# Patient Record
Sex: MALE | Race: WHITE | Employment: FULL TIME | URBAN - METROPOLITAN AREA
[De-identification: names, ages, dates, MRNs, and addresses within clinical notes are randomized per-mention and may not be internally consistent; named-entity substitution may affect disease eponyms.]

---

## 2019-04-23 ENCOUNTER — HOSPITAL ENCOUNTER (OUTPATIENT)
Dept: VASCULAR SURGERY | Age: 54
Discharge: HOME | End: 2019-04-23
Attending: NURSE PRACTITIONER
Payer: COMMERCIAL

## 2019-04-23 DIAGNOSIS — I10 HYPERTENSION, ESSENTIAL: ICD-10-CM

## 2019-04-23 LAB
ABDOMINAL DIST AORTA VEL: 103.9 CM/S
ABDOMINAL MID AORTA VEL: 98.4 CM/S
ABDOMINAL PROX AORTA VEL: 103.9 CM/S
LEFT KIDNEY ARCUATE ARTERY MEDIAL EDV: 11.4 CM/S
LEFT KIDNEY ARCUATE ARTERY MEDIAL PSV: 23.1 CM/S
LEFT KIDNEY LENGTH: 11.77 CM
LEFT KIDNEY MED ARCUATE RI: 0.51
LEFT KIDNEY WIDTH: 6.71 CM
LEFT RENAL DIST RAR: 0.72
LEFT RENAL DIST SYS: 74.3 CM/S
LEFT RENAL MID RAR: 0.81
LEFT RENAL MID SYS: 84.6 CM/S
LEFT RENAL MIDDLE PARENCHYMA MAX: 25.5 CM/S
LEFT RENAL MIDDLE PARENCHYMA MIN: 11.4 CM/S
LEFT RENAL MIDDLE PARENCHYMA RI: 0.55
LEFT RENAL ORIGIN RAR: 1.04
LEFT RENAL ORIGIN SYS: 107.6 CM/S
LEFT RENAL PROX RAR: 0.92
LEFT RENAL PROX SYS: 95.4 CM/S
RIGHT KIDNEY ARCUATE ARTERY MEDIAL EDV: 10.4 CM/S
RIGHT KIDNEY ARCUATE ARTERY MEDIAL PSV: 21.4 CM/S
RIGHT KIDNEY LENGTH: 11.44 CM
RIGHT KIDNEY MED ARCUATE RI: 0.51
RIGHT KIDNEY WIDTH: 6.16 CM
RIGHT RENAL DIST RAR: 0.74
RIGHT RENAL DIST SYS: 77.3 CM/S
RIGHT RENAL MID RAR: 0.84
RIGHT RENAL MID SYS: 86.9 CM/S
RIGHT RENAL MIDDLE PARENCHYMA MAX: 23.9 CM/S
RIGHT RENAL MIDDLE PARENCHYMA MIN: 11.6 CM/S
RIGHT RENAL MIDDLE PARENCHYMA RI: 0.51
RIGHT RENAL ORIGIN RAR: 1.05
RIGHT RENAL ORIGIN SYS: 109.1 CM/S
RIGHT RENAL PROX RAR: 0.94
RIGHT RENAL PROX SYS: 97.4 CM/S

## 2019-04-23 PROCEDURE — 93975 VASCULAR STUDY: CPT

## 2020-01-08 ENCOUNTER — HOSPITAL ENCOUNTER (OUTPATIENT)
Dept: GENERAL RADIOLOGY | Age: 55
Discharge: HOME | End: 2020-01-08
Payer: COMMERCIAL

## 2020-01-08 DIAGNOSIS — M54.9 BACK PAIN: ICD-10-CM

## 2020-01-08 PROCEDURE — 72072 X-RAY EXAM THORAC SPINE 3VWS: CPT

## 2020-01-08 PROCEDURE — 72050 X-RAY EXAM NECK SPINE 4/5VWS: CPT

## 2020-01-08 PROCEDURE — 72114 X-RAY EXAM L-S SPINE BENDING: CPT

## 2020-01-29 ENCOUNTER — HOSPITAL ENCOUNTER (OUTPATIENT)
Dept: MRI IMAGING | Age: 55
Discharge: HOME | End: 2020-01-29
Attending: NURSE PRACTITIONER
Payer: COMMERCIAL

## 2020-01-29 DIAGNOSIS — M48.02 SPINAL STENOSIS IN CERVICAL REGION: ICD-10-CM

## 2020-01-29 DIAGNOSIS — M25.50 PAIN IN JOINT, MULTIPLE SITES: ICD-10-CM

## 2020-01-29 DIAGNOSIS — G54.2 CERVICAL SYNDROME: ICD-10-CM

## 2020-01-29 DIAGNOSIS — M79.602 LEFT ARM PAIN: ICD-10-CM

## 2020-01-29 PROCEDURE — 72141 MRI NECK SPINE W/O DYE: CPT

## 2020-02-19 ENCOUNTER — HOSPITAL ENCOUNTER (OUTPATIENT)
Dept: PHYSICAL THERAPY | Age: 55
Discharge: HOME | End: 2020-02-19
Payer: COMMERCIAL

## 2020-02-19 PROCEDURE — 97014 ELECTRIC STIMULATION THERAPY: CPT

## 2020-02-19 PROCEDURE — 97162 PT EVAL MOD COMPLEX 30 MIN: CPT

## 2020-02-19 PROCEDURE — 97110 THERAPEUTIC EXERCISES: CPT

## 2020-02-19 NOTE — PROGRESS NOTES
PHYSICAL THERAPY - DAILY TREATMENT NOTE    Patient Name: Paola Masters        Date: 2020  : 1965   YES Patient  Verified  Visit #:   1     Insurance: Payor: Jenny Jacobson / Plan: Yahir LEAL / Product Type: Commerical /      In time: 7:30 Out time: 8:30   Total Treatment Time: 60     Medicare/BCBS [place] Time Tracking (below)   Total Timed Codes (min):  NA 1:1 Treatment Time:  NA     TREATMENT AREA =  Neck pain, C/S radiculopathy    SUBJECTIVE    Pain Level (on 0 to 10 scale):  2  / 10 left upper back; left UE tingling   Medication Changes/New allergies or changes in medical history, any new surgeries or procedures? NO    If yes, update Summary List   Subjective Functional Status/Changes:  []  No changes reported     Pt reports onset of left UE tingling and left neck/upper back pain several months ago. Pt reports left UE weakness. Pt reports that symptoms are worsened with certain positions of head, and reports that pain is interfering with sleep. Pt reports that lying flat on back with head elevated is generally what relieves symptoms. Pt reports that he thinks that symptoms may have been caused by lifting weights improperly. Pt reports that symptoms have become progressively worse, especially over past 2 weeks. Pt reports that cardiac issues were ruled out. Pt reports that he has had C/S x-rays, NCS left UE and MRI C/S. Pt reports that he has bulging discs C2-C6 and impingement C7. Pt reports that he has been prescribed several medications to assist with symptoms. Pt reports prior episodes of neck pain, which he attributed to sleeping wrong and which resolved on its own. OBJECTIVE    Physical Therapy Evaluation Cervical Spine - [facility]    SUBJECTIVE  Chief Complaint: See above    Mechanism of injury: See above    Symptoms  Pain rating (0-10):    Today: 2   Best: 0   Worst: 7    [] Contstant:    [x] Intermittent:     Aggravated by:   [x] Bending [x] Sitting [] Standing [x] Reaching Overhead   [] Moving [] Cough [] Sneeze [] Eating   [] AM  [] PM  Lying:  [] sup   [] pro   [] sidelying   [] Other:     Eased by:    [] Bending [] Sitting [] Standing Lying: [x] sup  [] pro  [] sidelying   [] Moving [] AM  [] PM  [x] Other: left UE overhead in supine     General Health:  Red Flags Indicated? [] Yes    [x] No  [] Yes [] No Recent weight change (If yes, due to dieting? [] Yes  [] No)   [] Yes [] No Persistent cough  [] Yes [] No Unremitting pain at night  [] Yes [] No Dizziness  [] Yes [] No Blurred vision  [] Yes [] No Hands more cold or painful in cold weather  [] Yes [] No Ringing in ears  [] Yes [] No Difficulty swallowing  [] Yes [] No Dysfunction of bowel or bladder  [] Yes [] No Recent illness within past 3 weeks (i.e, cold, flu)  [] Yes [] No Jaw pain    Past History/Treatments: Medications/lidocaine injection    Diagnostic Tests: [] Lab work [x] X-rays    [] CT [x] MRI     [x] Other: NCS  Results: See above    Functional Status  Prior level of function: Working (desk job), exercising at gym  Present functional limitations: Difficulty sleeping due to pain  What position do you sleep in?: Supine with pillows under head    Headaches: Do you have headaches? [] Yes   [x] No  How often do you get headaches? How long does the headache last?  What aggravates it? What relieves it? Does the headache coincide with any other symptoms (visual disturbances, light sensitivity)? Where is the headache? Does it change locations?   Other:    OBJECTIVE  Posture: [] WNL  Head Position: Protracted  Shoulder/Scapular Position: Protracted  C-Kyphosis:  [] increased   [] decreased   C-Lordosis:   [] increased   [x] decreased  T-Kyphosis:  [x] increased   [] decreased  T-Lordosis:   [] increased   [] decreased     TMJ: [x] N/A [] Abnormal - ROM:   Palpation:    Cervical Retraction: [] WNL    [x] Abnormal: Decreased motion; repeated seated C/S retraction peripheralizes (tingling) symptoms to left UE; repeated supine C/S retractions peripheralizes symptoms (tingling) to left UE     Shoulder/Scapular Screen: [x] WNL    [] Abnormal:    Active Movements: [] N/A   [] Too acute   [] Other:  ROM % AROM % PROM Comments:pain, area   Forward flexion 60     Extension 35  Left C/S pain   SB right 50     SB left  50  Left C/S tightness   Rotation right 60     Rotation left 55  Left C/S pain     Thoracic Spine: [] N/A    [x] WNL   [] Other:    PROM: NT    Palpation: - No TTP  [] Min  [] Mod  [] Severe    Location:  [] Min  [] Mod  [] Severe    Location:  [] Min  [] Mod  [] Severe    Location:    Neuro Screen (myotome/dematome/felexes): [] WNL  Myotome Level Muscle Test Myotome Level Muscle Test   C5 Shoulder Adduction - Deltoid C8 Finger Flexors   C6 Wrist Extension T1 Finger Abduction - Interossei   C7 Elbow Extension     Comments: Left elbow ext = 4/5, left wrist flex = 4/5, otherwise 5/5 B UE    Upper Limb Tension Tests: [] N/A       Ulnar: [] R    [] L    [] +    [] -       Median: [] R    [] L    [] +    [] -       Radial: [] R    [] L    [] +    [] -    Special Tests:  Cervical:        Vertebral Artery:  [] R    [] L    [] +    [] -       Alar Ligament: [] R    [] L    [] +    [] -       Transverse Lig: [] R    [] L    [] +    [] -       Spurling's:  [] R    [x] L    [x] +    [] -       Distraction:  [] R    [] L    [] +    [x] -       Compression: [] R    [x] L    [x] +    [] -    Thoracic Outlet Tests: [] N/A       Adson's:  [] R    [] L    [] +    [] -       Hyperabduction: [] R    [] L    [] +    [] -       Austin's:  [] R    [] L    [] +    [] -       Anu:  [] R    [] L    [] +    [] -    Diaphragmatic Breathing: [] Normal    [] Abnormal    Muscle Flexibility: [] N/A   Scalenes: [] WNL    [] Tight    [] R    [] L   Upper Trap: [] WNL    [] Tight    [] R    [] L   Levator: [] WNL    [] Tight    [] R    [] L   Pect.  Minor: [] WNL    [] Tight    [] R    [] L    Global Muscular Weakness: [] N/A   Lower Trap:   Rhomboids:   Middle Trap:   Serratus Ant:   Ext Rotators:    Other:    Other tests/comments: Left UE tingling abolishes in supine, returns with sitting; C/S traction increases left UE tingling; repeated C/S retraction with OP and retraction with extension in supine increase left UE tingling; repeated left C/S rotation in supine increases left UE tingling; repeated right C/S rotation in supine NE  + shoulder abduction test on left    Modalities Rationale: decrease pain to improve the patient's ability to perform ADLs/IADLs without increased pain/symptoms      15 min [x] Estim, type/location: IFC to C/S supine with heat                                     []  att     [x]  unatt     []  w/US     []  w/ice    [x]  w/heat    min []  Mechanical Traction: type/lbs                   []  pro   []  sup   []  int   []  cont    []  before manual    []  after manual    min []  Ultrasound, settings/location:      min []  Iontophoresis w/ dexamethasone, location:                                               []  take home patch       []  in clinic    min []  Ice     []  Heat    location/position:     min []  Vasopneumatic Device, press/temp:     min []  Other:    [x] Skin assessment post-treatment (if applicable):    [x]  intact    []  redness- no adverse reaction     []redness  adverse reaction:      8 min Therapeutic Exercise:  [x]  See flow sheet   Rationale:      increase ROM and decrease symptoms to improve the patients ability to perform ADLs/IADLs without increased pain/symptoms     During TE min Patient Education:  YES  Reviewed HEP   [x]  Progressed/Changed HEP based on:   Initiated supine C/S rotation; discussed centralization/peripheralization of symptoms and avoidance of activities that produce/increase/peripheralize symptoms; discussed positioning for sleep and work station ergonomics     Other Objective/Functional Measures:    See eval    Initiated repeated supine right C/S rotation     Post Treatment Pain Level (on 0 to 10) scale:   2  / 10     ASSESSMENT    Assessment/Changes in Function:     See plan of care    Justification for Eval Code Complexity:  Patient History : MEDIUM - HTN, latex allergy  Examination HIGH - See objective  Clinical Presentation: MEDIUM  Clinical Decision Making : MEDIUM - FOTO 52/100     []  See Progress Note/Recertification   Patient will continue to benefit from skilled PT services: see plan of care   Progress toward goals / Updated goals:    See plan of care       PLAN    [x]  Upgrade activities as tolerated YES Continue plan of care   []  Discharge due to :    []  Other:      Therapist: Rigoberto Price, PT    Date: 2/19/2020 Time: 7:29 AM

## 2020-02-19 NOTE — PROGRESS NOTES
[address]  [facility] PHYSICAL THERAPY  [address] [facility] [place], [address] - Phone: [phone]  Fax: [phone] / [address]  Patient Name: Jonny Campos : 1965   Medical   Diagnosis: Neck pain [M54.2]   Cervical radiculopathy [M54.12] Treatment Diagnosis: Neck pain [M54.2]   Cervical radiculopathy [M54.12]   Onset Date: Several months ago     Referral Source: Sumanth Martinez DO Start of [facility]): 2020   Prior Hospitalization: See medical history Provider #: [id number]   Prior Level of Function: Independent with ADLs, ambulation; working desk job; exercising at gym   Comorbidities: HTN, latex allergy   Medications: Verified on Patient Summary List   The Plan of Care and following information is based on the information from the initial evaluation.   ===========================================================================================  Assessment / key information:  Patient is a 53 y.o. male who presents with complaints of onset of left-sided neck pain and left UE tingling several months ago. Patient reports that imaging revealed bulging discs C2-C6 and nerve impingement C7. Patient demonstrates impaired posture, decreased/painful C/S ROM (see below), increased symptoms with C/S retraction and retraction with extension, decreased left elbow extensor/wrist flexor strength (consistent with C7 nerve impingement), + Spurling's test on left (consistent with cervical radiculopathy), decreased position/activity tolerance and impaired ADL/IADL function. Patient would benefit from skilled PT services to address these issues and improve function.   Thank you for this referral.    Active Movements:   ROM % AROM % PROM Comments:pain, area   Forward flexion 60       Extension 35   Left C/S pain   SB right 50       SB left  50   Left C/S tightness   Rotation right 60       Rotation left 55   Left C/S pain    FOTO: 52/100 (moderate)  ==========================================================================================  Eval Complexity: History: MEDIUM  Complexity : 1-2 comorbidities / personal factors will impact the outcome/ POC Exam:HIGH Complexity : 4+ Standardized tests and measures addressing body structure, function, activity limitation and / or participation in recreation  Presentation: MEDIUM Complexity : Evolving with changing characteristics  Clinical Decision Making:MEDIUM Complexity : FOTO score of 26-74Overall Complexity:MEDIUM    Problem List: pain affecting function, decrease ROM, decrease strength, decrease ADL/ functional abilitiies, decrease activity tolerance and decrease flexibility/ joint mobility   Treatment Plan may include any combination of the following: Therapeutic exercise, Therapeutic activities, Neuromuscular re-education, Physical agent/modality, Manual therapy, Patient education and Functional mobility training  Patient / Family readiness to learn indicated by: asking questions, trying to perform skills and interest  Persons(s) to be included in education: patient (P)  Barriers to Learning/Limitations: None  Measures taken:    Patient Goal (s): \"Correct bulging discs in neck which are impinging on nerve. \"   Patient self reported health status: good  Rehabilitation Potential: good   Short Term Goals: To be accomplished in  2  weeks:  1. Patient will demonstrate compliance with HEP. 2. Patient will demonstrate ability to centralize left UE symptoms to allow increased activity tolerance. 3. Patient will demonstrate symmetrical C/S rotation AROM to facilitate ADLs/IADLs.  Long Term Goals: To be accomplished in  4  weeks:  1. Patient will demonstrate independence with HEP. 2. Patient will report greater than or equal to 50% improvement in sleep disturbance. 3. Patient will report greater than or equal to 50% improvement in tolerance of work activities, including computer use. 4. Patient will score greater than or equal to 65/100 on FOTO to indicate improved function. Frequency / Duration:   Patient to be seen  2-3  times per week for 4  weeks:  Patient / Caregiver education and instruction: activity modification and exercises    Therapist Signature: Shefali Talbert PT Date: 7/53/5206   Certification Period: NA Time: 8:36 AM   ===========================================================================================  I certify that the above Physical Therapy Services are being furnished while the patient is under my care. I agree with the treatment plan and certify that this therapy is necessary. Physician Signature:        Date:       Time:     Please sign and return to [facility] or you may fax the signed copy to [phone]. Thank you.

## 2020-02-20 ENCOUNTER — HOSPITAL ENCOUNTER (OUTPATIENT)
Dept: PHYSICAL THERAPY | Age: 55
Discharge: HOME | End: 2020-02-20
Payer: COMMERCIAL

## 2020-02-20 PROCEDURE — 97530 THERAPEUTIC ACTIVITIES: CPT

## 2020-02-20 PROCEDURE — 97140 MANUAL THERAPY 1/> REGIONS: CPT

## 2020-02-20 PROCEDURE — 97110 THERAPEUTIC EXERCISES: CPT

## 2020-02-21 ENCOUNTER — APPOINTMENT (OUTPATIENT)
Dept: PHYSICAL THERAPY | Age: 55
End: 2020-02-21
Payer: COMMERCIAL

## 2020-02-24 ENCOUNTER — HOSPITAL ENCOUNTER (OUTPATIENT)
Dept: PHYSICAL THERAPY | Age: 55
Discharge: HOME | End: 2020-02-24
Payer: COMMERCIAL

## 2020-02-24 PROCEDURE — 97110 THERAPEUTIC EXERCISES: CPT

## 2020-02-24 PROCEDURE — 97012 MECHANICAL TRACTION THERAPY: CPT

## 2020-02-24 PROCEDURE — 97140 MANUAL THERAPY 1/> REGIONS: CPT

## 2020-02-24 NOTE — PROGRESS NOTES
PHYSICAL THERAPY - DAILY TREATMENT NOTE    Patient Name: Graciela Cam        Date: 2020  : 1965   YES Patient  Verified  Visit #:   3     Insurance: Payor: Vale Trevino / Plan: Jodie Cristina RPN / Product Type: Commerical /      In time: 7:59 Out time: 9:47   Total Treatment Time: 48       TREATMENT AREA = Neck pain [M54.2]    SUBJECTIVE    Pain Level (on 0 to 10 scale):  3  / 10   Medication Changes/New allergies or changes in medical history, any new surgeries or procedures? NO    If yes, update Summary List   Subjective Functional Status/Changes:  []  No changes reported     \"It's more tolerable. I was able to the homework lying down. The sensation is still there but more awake. I did get better sleep than I have been. I'm feeling more consistent pain to the (UT region). After Friday the whole day I didn't feel a whole lot in my left arm. I did have low grade discomfort throughout the right arm on the arm. very few instances of shooting pain. Right now my hand is numb. \"     \"I'm supposed to go to [place] on Saturday for a week, back for a week, and then  for a week.       OBJECTIVE     Therapeutic Procedures:  Min Procedure Specifics + Rationale   n/a [x]  Patient Education (performed throughout session) [x] Review HEP    [] Progressed/Changed HEP based on:   [] proper performance and advancement of Therex/TA   [] reduction in pain level    [] increased functional capacity       [] change in directional preference   10(10) [x] Therapeutic Exercise   [x]  See Flowsheet   Rationale: increase ROM and increase strength to improve the patients ability to participate in ADL's    25 [x]  Manual Therapy       [] IASTM -   [] TDN (see objective; actual needle insertion time not billed)   [x] supine manual traction  Supine repeated left rotation mobilization  Supine repeated left lateral side bend mobilization  Rationale: decrease pain, increase ROM, increase tissue extensibility, decrease trigger points and increase postural awareness to attain functional use and participation with ADL's  [x] Skin assessment post-treatment:  [x]intact []redness- no adverse reaction   []redness  adverse     Modality rationale: decrease inflammation, decrease pain, increase tissue extensibility and increase muscle contraction/control to improve the patients ability to perform ADL's with greater ease       Min Type Additional Details   15 [x]  Traction:    [x] Cervical  []Lumbar  [x] Intermitent   30/20seconds   []Continuous Steps up&down: 1up/1down  10lbs max/5lbs min  Angle: 30 Degrees   [x] supine              [] prone  [x] legs elevated    [] legs flat   [x] with MHP to C/S   [] with strap         [] without strap   [x] Skin assessment post-treatment:  [x]intact [x]redness- no adverse reaction       []redness  adverse reaction:     Other Objective/Functional Measures:    Improved tolerance towards isometrics. Discussed with patient travel plans  Educated patient re: mechanical traction.     Post Treatment Pain Level (on 0 to 10) scale:   2 / 10     ASSESSMENT    Assessment/Changes in Function:       Reduced traction pull time from 60:20 to 30:20  Centralization continues with confirmation of left lateral component being relevant as patient's numbness changed to tingling in fingers and had reported centralization to shoulder          Patient will continue to benefit from skilled PT services to modify and progress therapeutic interventions, address functional mobility deficits, address ROM deficits, address strength deficits, analyze and address soft tissue restrictions, analyze and cue movement patterns, analyze and modify body mechanics/ergonomics and instruct in home and community integration  to attain remaining goals   Progress toward goals / Updated goals:    Improved cervical posture     PLAN    [x]  Upgrade activities as tolerated  [x]  Update interventions per flow sheet YES Continue plan of care   []  Discharge due to :    []  Other:      Therapist: Jalen Encinas \"BJ\" Sandy, DPT, Cert. MDT, Cert. DN, Cert. SMT, Dip.  Osteopractic    Date: 2/24/2020 Time: 8:05 AM     Future Appointments   Date Time Provider Castillo Leona   2/26/2020  8:00 AM Naida Libman, [facility]   2/27/2020  5:00 PM Naida Libman, [facility]   3/6/2020  8:00 AM Naida Libman, [facility]   3/9/2020  8:00 AM Naida Libman, [facility]   3/11/2020  4:30 PM Champ Osei [facility]   3/13/2020  8:00 AM Naida Libman, [facility]   3/23/2020  8:00 AM Naida Libman, [facility]   3/25/2020  4:30 PM Tessie Royal [facility]   3/27/2020  8:00 AM Champ Osei [facility]   3/30/2020  8:00 AM Naida Libman, [facility]

## 2020-02-26 ENCOUNTER — HOSPITAL ENCOUNTER (OUTPATIENT)
Dept: PHYSICAL THERAPY | Age: 55
Discharge: HOME | End: 2020-02-26
Payer: COMMERCIAL

## 2020-02-26 PROCEDURE — 97530 THERAPEUTIC ACTIVITIES: CPT

## 2020-02-26 PROCEDURE — 97110 THERAPEUTIC EXERCISES: CPT

## 2020-02-26 PROCEDURE — 97014 ELECTRIC STIMULATION THERAPY: CPT

## 2020-02-26 PROCEDURE — 97012 MECHANICAL TRACTION THERAPY: CPT

## 2020-02-26 PROCEDURE — 97140 MANUAL THERAPY 1/> REGIONS: CPT

## 2020-02-26 NOTE — PROGRESS NOTES
PHYSICAL THERAPY - DAILY TREATMENT NOTE    Patient Name: Alessandra Fitzgerald        Date: 2020  : 1965   YES Patient  Verified  Visit #:   4     Insurance: Payor: Milli Galeana / Plan: Isreal Castro RPN / Product Type: Commerical /      In time: 8:00 Out time: 8:55   Total Treatment Time: 55     TREATMENT AREA = Neck pain [M54.2]    SUBJECTIVE    Pain Level (on 0 to 10 scale):  3  / 10   Medication Changes/New allergies or changes in medical history, any new surgeries or procedures? NO    If yes, update Summary List   Subjective Functional Status/Changes:  []  No changes reported     \"I cancelled my trip to [place]. I have an overnighter to [place] today though. \"          OBJECTIVE    Therapeutic Procedures:  Min Procedure Specifics + Rationale   n/a [x]  Patient Education (performed throughout session) [x] Review HEP    [] Progressed/Changed HEP based on:   [] proper performance and advancement of Therex/TA   [] reduction in pain level    [] increased functional capacity       [] change in directional preference   15 [x] Therapeutic Exercise   [x]  See Flowsheet   Rationale: increase ROM and increase strength to improve the patients ability to participate in ADL's    15 []  Manual Therapy       [] IASTM    [] TDN (see objective; actual needle insertion time not billed)   [x] repeated retraction mobilization in supine  Rationale: decrease pain, increase ROM, increase tissue extensibility, decrease trigger points and increase postural awareness to attain functional use and participation with ADL's  [x] Skin assessment post-treatment:  [x]intact []redness- no adverse reaction   []redness  adverse   10 [x] Therapeutic Activity   [x]  See Flowsheet  Performed repeated retractions with SB Left /rotationLeft throughout treatment b/w sets/reps/exercises as needed as prophylaxis and symptoms management. Rationale:  To improve safety, proprioception, coordination, and efficiency with tasks     Modality rationale: decrease inflammation, decrease pain, increase tissue extensibility and increase muscle contraction/control to improve the patients ability to perform ADL's with greater ease       Min Type Additional Details   15 [x] E-Stim Type:Symmetrical Bihasic  Attended? NO Location: left C/S and UE  [] supine              [] prone  [] legs elevated   [] legs flat  []w/heat  []w/ice  [] sitting    15 [x]  Traction:    [x] Cervical  []Lumbar  [x] Intermitent      []Continuous Ramp/ hold/ lbs:  15/10  Angle: 30 Degrees   [x] supine              [] prone  [x] legs elevated    [] legs flat   [x] with MHP to C/S   [] with strap         [x] without strap   [x] Skin assessment post-treatment:  [x]intact [x]redness- no adverse reaction       []redness  adverse reaction:     Other Objective/Functional Measures: Added new therex per flow sheet     Post Treatment Pain Level (on 0 to 10) scale:   2  / 10     ASSESSMENT    Assessment/Changes in Function:     Improved cervical posture  Left UE paraesthesia during wall wash but transient. Patient will continue to benefit from skilled PT services to modify and progress therapeutic interventions, address functional mobility deficits, address ROM deficits, address strength deficits, analyze and address soft tissue restrictions, analyze and cue movement patterns, analyze and modify body mechanics/ergonomics and instruct in home and community integration  to attain remaining goals   Progress toward goals / Updated goals:    Derangement reducing. PLAN    [x]  Upgrade activities as tolerated  [x]  Update interventions per flow sheet YES Continue plan of care   []  Discharge due to :    []  Other:      Therapist: Liliana Finch \"BJ\" PRAKASH Delgado, Cert. MDT, Cert. DN, Cert. SMT, Dip.  Osteopractic    Date: 2/26/2020 Time: 8:00 AM     Future Appointments   Date Time Provider [facility] [place]   2/27/2020  5:00 PM Alondra Jerome [address] [place] [address]   3/6/2020  8:00 AM Alondra Jerome [facility]   3/9/2020  8:00 AM Maurice Ny [facility]   3/11/2020  4:30 PM Nuriaine Boeck [facility]   3/13/2020  8:00 AM Maurice Ny, [facility]   3/23/2020  8:00 AM Maurice Ny [facility]   3/25/2020  4:30 PM Nuriaine Boeck [facility]   3/27/2020  8:00 AM Nuriaine Boeck [facility]   3/30/2020  8:00 AM Maurice Ny [facility]

## 2020-02-27 ENCOUNTER — HOSPITAL ENCOUNTER (OUTPATIENT)
Dept: PHYSICAL THERAPY | Age: 55
Discharge: HOME | End: 2020-02-27
Payer: COMMERCIAL

## 2020-02-27 PROCEDURE — 97140 MANUAL THERAPY 1/> REGIONS: CPT

## 2020-02-27 PROCEDURE — 97014 ELECTRIC STIMULATION THERAPY: CPT

## 2020-02-27 PROCEDURE — 97110 THERAPEUTIC EXERCISES: CPT

## 2020-02-27 NOTE — PROGRESS NOTES
PHYSICAL THERAPY - DAILY TREATMENT NOTE    Patient Name: Jonny Campos        Date: 2020  : 1965   YES Patient  Verified  Visit #:   5     Insurance: Payor: Angela Palafox / Plan: Jacinta Rodriguez RPN / Product Type: Commerical /      In time: 5:00 Out time: 5:50   Total Treatment Time: 50     TREATMENT AREA = Neck pain [M54.2]    SUBJECTIVE    Pain Level (on 0 to 10 scale):  3  / 10   Medication Changes/New allergies or changes in medical history, any new surgeries or procedures? NO    If yes, update Summary List   Subjective Functional Status/Changes:  []  No changes reported     \"I've actually been pretty good today until I got home from [place]. I rode with someone on the way up and back . It was uncomfortable.  \"          OBJECTIVE    Therapeutic Procedures:  Min Procedure Specifics + Rationale   n/a [x]  Patient Education (performed throughout session) [x] Review HEP    [] Progressed/Changed HEP based on:   [] proper performance and advancement of Therex/TA   [] reduction in pain level    [] increased functional capacity       [] change in directional preference   10 [x] Therapeutic Exercise   [x]  See Flowsheet   Rationale: increase ROM and increase strength to improve the patients ability to participate in ADL's    25 []  Manual Therapy       [] IASTM    [] TDN (see objective; actual needle insertion time not billed)   [x] seated repeated retraction with traction  Seated repeated left rotation mobilization  Seated left SB mobilization  Supine traction mobilization  Supine repeated left side bend mobilization  Rationale: decrease pain, increase ROM, increase tissue extensibility, decrease trigger points and increase postural awareness to attain functional use and participation with ADL's  [x] Skin assessment post-treatment:  [x]intact []redness- no adverse reaction   []redness  adverse     Modality rationale: decrease inflammation, decrease pain, increase tissue extensibility and increase muscle contraction/control to improve the patients ability to perform ADL's with greater ease       Min Type Additional Details   15 [x] E-Stim Type:Symmetrical Biphasic  Attended? NO Location: left UT/arm  [] supine              [] prone  [] legs elevated   [] legs flat  []w/heat  []w/ice  [] sitting    [x] Skin assessment post-treatment:  [x]intact [x]redness- no adverse reaction       []redness  adverse reaction:     Other Objective/Functional Measures: Added seated retraction with T/S extension, progressing to include C/S extension with T/S extension  Initial presentation of neck posture noted to have return of right wry neck. Post Treatment Pain Level (on 0 to 10) scale:   1-2  / 10     ASSESSMENT    Assessment/Changes in Function:     Correction of right wry neck gradually restored with         Patient will continue to benefit from skilled PT services to modify and progress therapeutic interventions, address functional mobility deficits, address ROM deficits, address strength deficits, analyze and address soft tissue restrictions, analyze and cue movement patterns, analyze and modify body mechanics/ergonomics, assess and modify postural abnormalities, address imbalance/dizziness and instruct in home and community integration  to attain remaining goals   Progress toward goals / Updated goals:    Improved pain level despite recent exacerbation of wry neck. PLAN    [x]  Upgrade activities as tolerated  [x]  Update interventions per flow sheet YES Continue plan of care   []  Discharge due to :    []  Other:      Therapist: Billy Hylton \"BJ\" PRAKASH Delgado, Cert. MDT, Cert. DN, Cert. SMT, Dip.  Osteopractic    Date: 2/27/2020 Time: 5:01 PM     Future Appointments   Date Time Provider Castillo Delgadillo   3/2/2020  8:00 AM Rudy Duron [facility]   3/4/2020  8:00 AM Chaya [facility]   3/6/2020  8:00 AM Chaya [facility]   3/9/2020  8:00 AM Chaya [facility]   3/11/2020  4[facility]30 [facility]   3/13/2020  8:00 AM Charmayne Landsman, PT [facility]   3/23/2020  8:00 AM Charmayne Landsman, PT [facility]   3/25/2020  4:30 PM [facility]   3/27/2020  8:00 AM [facility]   3/30/2020  8:00 AM Charmayne Landsman, PT [facility]

## 2020-03-02 ENCOUNTER — HOSPITAL ENCOUNTER (OUTPATIENT)
Dept: PHYSICAL THERAPY | Age: 55
Discharge: HOME | End: 2020-03-02
Payer: COMMERCIAL

## 2020-03-02 PROCEDURE — 97530 THERAPEUTIC ACTIVITIES: CPT

## 2020-03-02 PROCEDURE — 97012 MECHANICAL TRACTION THERAPY: CPT

## 2020-03-02 PROCEDURE — 97110 THERAPEUTIC EXERCISES: CPT

## 2020-03-02 PROCEDURE — 97140 MANUAL THERAPY 1/> REGIONS: CPT

## 2020-03-02 NOTE — PROGRESS NOTES
PHYSICAL THERAPY - DAILY TREATMENT NOTE    Patient Name: Diana De La Garza        Date: 3/2/2020  : 1965   yes Patient  Verified  Visit #:   6     Insurance: Payor: Dede Tompkins / Plan: Dede Lynne RPN / Product Type: Commerical /      In time: 843 Out time: 840   Total Treatment Time: 66     TREATMENT AREA =  Neck pain [M54.2]    SUBJECTIVE  Pain Level (on 0 to 10 scale):  2  / 10   Medication Changes/New allergies or changes in medical history, any new surgeries or procedures?    no  If yes, update Summary List   Subjective Functional Status/Changes:  []  No changes reported     Pt reports no increase sxs over the weekend, \"I took it easy\"  Pt reports increased centralization to left SH then prior to [facility], however, intermittent radic sxs to the hand       OBJECTIVE  Modalities Rationale:     decrease pain and increase tissue extensibility to improve patient's ability to  perform c/s AROM for ADLs/driving, lifting, reaching, and to perform prolong sitting without pain     min [] Estim, type/location:                                      []  att     []  unatt     []  w/US     []  w/ice    []  w/heat   10 min [x]  Mechanical Traction: type/lbs 30 deg angle   18#>5#  30\":20\" with MHP                  []  pro   [x]  sup   [x]  int   []  cont    []  before manual    [x]  after manual    min []  Ultrasound, settings/location:      min []  Iontophoresis w/ dexamethasone, location:                                               []  take home patch       []  in clinic    min []  Ice     []  Heat    location/position:     min []  Vasopneumatic Device, press/temp:     min []  Other:    [x] Skin assessment post-treatment (if applicable):    [x]  intact    []  redness- no adverse reaction     []redness  adverse reaction:        38 min Therapeutic Exercise:  [x]  See flow sheet   Rationale:      increase ROM, increase strength and improve coordination to improve the patients ability to  perform c/s AROM for ADLs/driving, lifting, reaching, and to perform prolong sitting without pain       10 min Manual Therapy: SOR, STM to c/s guillermo, rep c/s ret> Rot left  rep c/s SB left- periph; manual c/s TX centralized to Left SH   Rationale:      decrease pain, increase ROM, increase tissue extensibility, decrease trigger points and increase postural awareness to improve patient's ability to  perform c/s AROM for ADLs/driving, lifting, reaching, and to perform prolong sitting without pain      8 min Therapeutic Activity: [x]  See flow sheet  postural/bed mobility training   Rationale:    increase ROM and increase strength to improve the patients ability to  perform c/s AROM for ADLs/driving, lifting, reaching, and to perform prolong sitting without pain        Billed With/As:   [] TE   [x] TA   [] Neuro   [] Self Care Patient Education: [x] Review HEP    [] Progressed/Changed HEP based on:   [x] positioning   [x] body mechanics   [x] transfers   [] heat/ice application    [x] other: Pt ed on importance and benefits of compliance with HEP, core strength/stability and proper posture; pt verbalized understanding       Other Objective/Functional Measures:  VCs + demo to perform proper technique for TE  rep c/s ret- P/W, rep ret + left rot=B/B  rep c/s SB left -P/NE  rep t/s ext - P/W periph to bicep/deltiod; able to centralize to Left SH with c/s ret + left rot   attempted 1/2 FR under spine in supine performing scap stabs   Post Treatment Pain Level (on 0 to 10) scale:  1-2 / 10     ASSESSMENT  Assessment/Changes in Function:   c/s rot WNL B sides  limited by periph  instructed to perform rep c/s ret + rot every hr   []  See Progress Note/Recertification   Patient will continue to benefit from skilled PT services to modify and progress therapeutic interventions, address functional mobility deficits, address ROM deficits, address strength deficits, analyze and address soft tissue restrictions, analyze and cue movement patterns, analyze and modify body mechanics/ergonomics, assess and modify postural abnormalities and instruct in home and community integration to attain remaining goals. Progress toward goals / Updated goals: · Short Term Goals: To be accomplished in  2  weeks:  1. Patient will demonstrate compliance with HEP. 2. Patient will demonstrate ability to centralize left UE symptoms to allow increased activity tolerance. progressing, intermittently centralized to left SH  3. Patient will demonstrate symmetrical C/S rotation AROM to facilitate ADLs/IADLs. progressing, c/s rot   · Long Term Goals: To be accomplished in  4  weeks:  1. Patient will demonstrate independence with HEP. 2. Patient will report greater than or equal to 50% improvement in sleep disturbance. 3. Patient will report greater than or equal to 50% improvement in tolerance of work activities, including computer use.    4. Patient will score greater than or equal to 65/100 on FOTO to indicate improved function     PLAN  []  Upgrade activities as tolerated yes Continue plan of care   []  Discharge due to :    []  Other:      Therapist: Sharyle Mosher, PTA    Date: 3/2/2020 Time: 8:10 AM     Future Appointments   Date Time Provider Castillo Delgadillo   3/4/2020  8:00 AM Yanni Burden [facility]   3/6/2020  8:00 AM Yanni Burden [facility]   3/9/2020  8:00 AM Yanni Burden [facility]   3/11/2020  4:30 PM Aditi Rafa, [facility]   3/13/2020  8:00 AM Yanni Burden [facility]   3/23/2020  8:00 AM Yanni Burden [facility]   3/25/2020  4:30 PM Cierra Oneal [facility]   3/27/2020  8:00 AM Aditi Score, [facility]   3/30/2020  8:00 AM Yanni Burden, [facility]

## 2020-03-04 ENCOUNTER — HOSPITAL ENCOUNTER (OUTPATIENT)
Dept: PHYSICAL THERAPY | Age: 55
Discharge: HOME | End: 2020-03-04
Payer: COMMERCIAL

## 2020-03-04 PROCEDURE — 97110 THERAPEUTIC EXERCISES: CPT

## 2020-03-04 PROCEDURE — 97012 MECHANICAL TRACTION THERAPY: CPT

## 2020-03-04 NOTE — PROGRESS NOTES
PHYSICAL THERAPY - DAILY TREATMENT NOTE    Patient Name: Gretchen Le        Date: 3/4/2020  : 1965   YES Patient  Verified  Visit #:     Insurance: Payor: Dayday Ludwig / Plan: Roy Ormond RPN / Product Type: Commerical /      In time: 7:58 Out time: 8:51   Total Treatment Time: 53     TREATMENT AREA = Neck pain [M54.2]    SUBJECTIVE    Pain Level (on 0 to 10 scale):  2  / 10   Medication Changes/New allergies or changes in medical history, any new surgeries or procedures? NO    If yes, update Summary List   Subjective Functional Status/Changes:  []  No changes reported     \"Some in the finger tips. Most of the discomfort is in the left upper shoulder area. \"          OBJECTIVE    Therapeutic Procedures:  Min Procedure Specifics + Rationale   n/a [x]  Patient Education (performed throughout session) [x] Review HEP    [] Progressed/Changed HEP based on:   [] proper performance and advancement of Therex/TA   [] reduction in pain level    [] increased functional capacity       [] change in directional preference   38 [x] Therapeutic Exercise   [x]  See Flowsheet   Rationale: increase ROM and increase strength to improve the patients ability to participate in ADL's      Modality rationale: decrease inflammation, decrease pain, increase tissue extensibility and increase muscle contraction/control to improve the patients ability to perform ADL's with greater ease       Min Type Additional Details   15 [x]  Traction:    [x] Cervical  []Lumbar  [x] Intermitent      []Continuous Ramp/ hold/ lbs:  14/5  Angle: 30 Degrees   [x] supine              [] prone  [x] legs elevated    [] legs flat   [x] with MHP to C/S   [] with strap         [x] without strap   [x] Skin assessment post-treatment:  [x]intact [x]redness- no adverse reaction       []redness  adverse reaction:     Other Objective/Functional Measures:    Initiated left rotation but peripheralized worse.  Patient reports better response towards SB in loaded position vs left rotation   Added more stability therex per flow sheet. Transitioned supine scap stabs to standing at wall with C/S Iso. Post Treatment Pain Level (on 0 to 10) scale:   2shoulder  / 10     ASSESSMENT    Assessment/Changes in Function:     Lateral component reducing as patient able to perform all therex for stability wthout exacerbation of symptoms         Patient will continue to benefit from skilled PT services to modify and progress therapeutic interventions, address functional mobility deficits, address ROM deficits, address strength deficits, analyze and address soft tissue restrictions, analyze and cue movement patterns, analyze and modify body mechanics/ergonomics, assess and modify postural abnormalities, address imbalance/dizziness and instruct in home and community integration  to attain remaining goals   Progress toward goals / Updated goals:    Postural deformity in wry neck appears to have resolved. PLAN    [x]  Upgrade activities as tolerated  [x]  Update interventions per flow sheet YES Continue plan of care   []  Discharge due to :    []  Other:      Therapist: Jalen Encinas \"BJ\" Sandy, PRAKASH, Cert. MDT, Cert. DN, Cert. SMT, Dip.  Osteopractic    Date: 3/4/2020 Time: 8:01 AM     Future Appointments   Date Time Provider Castillo Delgadillo   3/6/2020  8:00 AM Naida Libman, [facility]   3/9/2020  8:00 AM Naida Libman, PT [facility]   3/11/2020  4:30 PM Champ Osei [facility]   3/13/2020  8:00 AM Naida Libman, [facility]   3/23/2020  8:00 AM Naida Libman, PT [facility]   3/25/2020  4:30 PM Tessie [facility]   3/27/2020  8:00 AM Champ Osei [facility]   3/30/2020  8:00 AM Naida Libman, [facility]

## 2020-03-06 ENCOUNTER — HOSPITAL ENCOUNTER (OUTPATIENT)
Dept: PHYSICAL THERAPY | Age: 55
Discharge: HOME | End: 2020-03-06
Payer: COMMERCIAL

## 2020-03-06 PROCEDURE — 97012 MECHANICAL TRACTION THERAPY: CPT

## 2020-03-06 PROCEDURE — 97110 THERAPEUTIC EXERCISES: CPT

## 2020-03-06 NOTE — PROGRESS NOTES
PHYSICAL THERAPY - DAILY TREATMENT NOTE    Patient Name: Carl Combs        Date: 3/6/2020  : 1965   YES Patient  Verified  Visit #:     Insurance: Payor: Carie Berry / Plan: Flakita LEAL / Product Type: Commerical /      In time: 7:59 Out time: 8:53   Total Treatment Time: 53     TREATMENT AREA = Neck pain [M54.2]    SUBJECTIVE    Pain Level (on 0 to 10 scale):  1  / 10   Medication Changes/New allergies or changes in medical history, any new surgeries or procedures? NO    If yes, update Summary List   Subjective Functional Status/Changes:  []  No changes reported     \"Going to go up to [place] this weekend, but I'll be a passenger. \"          OBJECTIVE    Therapeutic Procedures:  Min Procedure Specifics + Rationale   n/a [x]  Patient Education (performed throughout session) [x] Review HEP    [] Progressed/Changed HEP based on:   [] proper performance and advancement of Therex/TA   [] reduction in pain level    [] increased functional capacity       [] change in directional preference   38 [x] Therapeutic Exercise   [x]  See Flowsheet   Rationale: increase ROM and increase strength to improve the patients ability to participate in ADL's      Modality rationale: decrease inflammation, decrease pain, increase tissue extensibility and increase muscle contraction/control to improve the patients ability to perform ADL's with greater ease       Min Type Additional Details   15 [x]  Traction:    [x] Cervical  []Lumbar  [x] Intermitent      []Continuous Ramp/ hold/ lbs:  13/5  Angle: 30 Degrees   [x] supine              [] prone  [x] legs elevated    [] legs flat   [x] with MHP to C/S   [] with strap         [x] without strap   [x] Skin assessment post-treatment:  [x]intact [x]redness- no adverse reaction       []redness  adverse reaction:     Other Objective/Functional Measures:    Increased reps/sets/resistance per flow sheet. No lateral wry neck throughout session.    Minor complaint in paraesthesia produced during triceps extensions with GTB, alleviated with OTB and repeated movements  Added standing open book and warrior 2. Post Treatment Pain Level (on 0 to 10) scale:   1  / 10     ASSESSMENT    Assessment/Changes in Function:     Fair tolerance towards progression of standing therex with minor recurrence of paraesthesia         Patient will continue to benefit from skilled PT services to modify and progress therapeutic interventions, address functional mobility deficits, address ROM deficits, address strength deficits, analyze and address soft tissue restrictions, analyze and cue movement patterns, analyze and modify body mechanics/ergonomics and instruct in home and community integration  to attain remaining goals   Progress toward goals / Updated goals:    Progressing well in reduction of derangement     PLAN    [x]  Upgrade activities as tolerated  [x]  Update interventions per flow sheet YES Continue plan of care   []  Discharge due to :    []  Other:      Therapist: Yahir Angela \"BJ\" Lis Jerry, DPT, Cert. MDT, Cert. DN, Cert. SMT, Dip.  Osteopractic    Date: 3/6/2020 Time: 8:09 AM     Future Appointments   Date Time Provider Castillo Delgadillo   3/9/2020  8:00 AM Piedad Alves [facility]   3/11/2020  4:30 PM Mohamud Cortez [facility]   3/13/2020  8:00 AM Piedad Alves [facility]   3/23/2020  8:00 AM Piedad Alves [facility]   3/25/2020  4:30 PM Mamie [facility]   3/27/2020  8:00 AM Mohamud Cortez [facility]   3/30/2020  8:00 AM Piedad Alves [facility]

## 2020-03-09 ENCOUNTER — HOSPITAL ENCOUNTER (OUTPATIENT)
Dept: PHYSICAL THERAPY | Age: 55
Discharge: HOME | End: 2020-03-09
Payer: COMMERCIAL

## 2020-03-09 PROCEDURE — 97012 MECHANICAL TRACTION THERAPY: CPT

## 2020-03-09 PROCEDURE — 97140 MANUAL THERAPY 1/> REGIONS: CPT

## 2020-03-09 PROCEDURE — 97110 THERAPEUTIC EXERCISES: CPT

## 2020-03-09 NOTE — PROGRESS NOTES
PHYSICAL THERAPY - DAILY TREATMENT NOTE    Patient Name: Minnie Rojas        Date: 3/9/2020  : 1965   YES Patient  Verified  Visit #:     Insurance: Payor: Kezia Alexander / Plan: Henrry LEAL / Product Type: Commerical /      In time: 8:00 Out time: 8:50   Total Treatment Time: 50     TREATMENT AREA = Neck pain [M54.2]    SUBJECTIVE    Pain Level (on 0 to 10 scale):  1  / 10   Medication Changes/New allergies or changes in medical history, any new surgeries or procedures? NO    If yes, update Summary List   Subjective Functional Status/Changes:  []  No changes reported     \"Not much in the arm in intensity, but there's something there down to the arm.  I feel like it's harder to do the exer\"          OBJECTIVE    Therapeutic Procedures:  Min Procedure Specifics + Rationale   n/a [x]  Patient Education (performed throughout session) [x] Review HEP    [] Progressed/Changed HEP based on:   [] proper performance and advancement of Therex/TA   [] reduction in pain level    [] increased functional capacity       [] change in directional preference   25 [x] Therapeutic Exercise   [x]  See Flowsheet   Rationale: increase ROM and increase strength to improve the patients ability to participate in ADL's    10 []  Manual Therapy       [] IASTM  FRAM to CT junction, dynamic cupping to CT junction  [] TDN (see objective; actual needle insertion time not billed)     Rationale: decrease pain, increase ROM, increase tissue extensibility, decrease trigger points and increase postural awareness to attain functional use and participation with ADL's  [x] Skin assessment post-treatment:  [x]intact []redness- no adverse reaction   []redness  adverse     Modality rationale: decrease inflammation, decrease pain, increase tissue extensibility and increase muscle contraction/control to improve the patients ability to perform ADL's with greater ease       Min Type Additional Details   15 [x]  Traction:    [x] Cervical  []Lumbar  [x] Intermitent      []Continuous Ramp/ hold/ lbs:  14/5  Angle: 30 Degrees   [x] supine              [] prone  [x] legs elevated    [] legs flat   [x] with MHP to C/S   [] with strap         [x] without strap   [x] Skin assessment post-treatment:  [x]intact [x]redness- no adverse reaction       []redness  adverse reaction:     Other Objective/Functional Measures:    Patient educated on purpose of Instrument Assisted Soft Tissue Mobilization, neurophysiological effects, biochemical effects, and biomechanical effects in relation to his condition. He verbalized understanding. Patient assessed for appropriateness pre-IASTM, after which consent to proceed was given. Therapist discussed post-IATSM effects and any adverse reactions (if appropriate) to determine further PT intervention through IASTM. Discussed with patient trial of DN, patient agreeable     Post Treatment Pain Level (on 0 to 10) scale:   0-1  / 10     ASSESSMENT    Assessment/Changes in Function:     Responded well to IASTM         Patient will continue to benefit from skilled PT services to modify and progress therapeutic interventions, address functional mobility deficits, address ROM deficits, address strength deficits, analyze and address soft tissue restrictions, analyze and cue movement patterns, analyze and modify body mechanics/ergonomics and instruct in home and community integration  to attain remaining goals   Progress toward goals / Updated goals:    Sent DN note to patient's physician     PLAN    [x]  Upgrade activities as tolerated  [x]  Update interventions per flow sheet YES Continue plan of care   []  Discharge due to :    []  Other:      Therapist: Mario Alberto Mccoy \"NISHA\" Saranya Mendieta DPT, Cert. MDT, Cert. DN, Cert. SMT, Dip.  Osteopractic    Date: 3/9/2020 Time: 8:09 AM     Future Appointments   Date Time Provider Castillo Delgadillo   3/11/2020  4:30 PM [facility]   3/13/2020  8:00 AM Shabana Mesa [facility] 3/23/2020  8:00 AM Eloy Ruff PT [facility]   3/25/2020  4:30 PM [facility]   3/27/2020  8:00 AM [facility]   3/30/2020  8:00 AM Eloy Ruff PT [facility]

## 2020-03-11 ENCOUNTER — HOSPITAL ENCOUNTER (OUTPATIENT)
Dept: PHYSICAL THERAPY | Age: 55
Discharge: HOME | End: 2020-03-11
Payer: COMMERCIAL

## 2020-03-11 PROCEDURE — 97012 MECHANICAL TRACTION THERAPY: CPT

## 2020-03-11 PROCEDURE — 97140 MANUAL THERAPY 1/> REGIONS: CPT

## 2020-03-11 PROCEDURE — 97110 THERAPEUTIC EXERCISES: CPT

## 2020-03-11 NOTE — PROGRESS NOTES
PHYSICAL THERAPY - DAILY TREATMENT NOTE    Patient Name: Usman Chan        Date: 3/11/2020  : 1965   yes Patient  Verified  Visit #:   10    Insurance: Payor: Merry Meehan / Plan: Shreyas Cottrell RPN / Product Type: Commerical /      In time:  430 Out time: 079   Total Treatment Time: 69     TREATMENT AREA =  Neck pain [M54.2]    SUBJECTIVE  Pain Level (on 0 to 10 scale):   2  / 10   Medication Changes/New allergies or changes in medical history, any new surgeries or procedures?    no  If yes, update Summary List   Subjective Functional Status/Changes:  []  No changes reported      Pt reports 75% improvement in sleep and overall in sxs since [facility]  pt reports 4-5 hr of sleep without interruption      Pt with no difficulty turning head or reaching behind      OBJECTIVE  Modalities Rationale:     decrease pain and increase tissue extensibility to improve patient's ability to  perform c/s AROM for ADLs/driving, lifting, reaching, and to perform prolong sitting without pain     min [] Estim, type/location:                                      []  att     []  unatt     []  w/US     []  w/ice    []  w/heat   10 min [x]  Mechanical Traction: type/lbs 30 deg angle   17#>5#  60\":20\" with MHP                  []  pro   [x]  sup   [x]  int   []  cont    []  before manual    [x]  after manual    min []  Ultrasound, settings/location:      min []  Iontophoresis w/ dexamethasone, location:                                               []  take home patch       []  in clinic    min []  Ice     []  Heat    location/position:     min []  Vasopneumatic Device, press/temp:     min []  Other:    [x] Skin assessment post-treatment (if applicable):    [x]  intact    []  redness- no adverse reaction     []redness  adverse reaction:         44 min Therapeutic Exercise:  [x]  See flow sheet   Rationale:      increase ROM, increase strength and improve coordination to improve the patients ability to  perform c/s AROM for ADLs/driving, lifting, reaching, and to perform prolong sitting without pain       15 min Manual Therapy: FRAMS (F1-2) + IASTM static cupping to c/s guillermo/UT/MT/scap border  performed prone Ms + Ts with cupping   Rationale:      decrease pain, increase ROM, increase tissue extensibility, decrease trigger points and increase postural awareness to improve patient's ability to  perform c/s AROM for ADLs/driving, lifting, reaching, and to perform prolong sitting without pain        Billed With/As:   [] TE   [x] TA   [] Neuro   [] Self Care Patient Education: [x] Review HEP    [] Progressed/Changed HEP based on:   [x] positioning   [x] body mechanics   [x] transfers   [] heat/ice application    [x] other: Pt ed on importance and benefits of compliance with HEP, core strength/stability and proper posture; pt verbalized understanding       Other Objective/Functional Measures:  VCs + demo to perform proper technique for TE  pt with \"a little difficulty'' raising 25# OH  demos decrease LSH ext and horz abd AROM in prone with increase scap winging  compensation  demos decrease [facility] with OTB in standing at wall  FOTO=72   Post Treatment Pain Level (on 0 to 10) scale:   1 / 10     ASSESSMENT  Assessment/Changes in Function:   Progressed there-ex without c/o increase p!  increased sxs in prone with UE in neutral   See PN     []  See Progress Note/Recertification   Patient will continue to benefit from skilled PT services to modify and progress therapeutic interventions, address functional mobility deficits, address ROM deficits, address strength deficits, analyze and address soft tissue restrictions, analyze and cue movement patterns, analyze and modify body mechanics/ergonomics, assess and modify postural abnormalities and instruct in home and community integration to attain remaining goals. Progress toward goals / Updated goals: · Short Term Goals: To be accomplished in  2  weeks:  1.  Patient will demonstrate compliance with HEP. 2. Patient will demonstrate ability to centralize left UE symptoms to allow increased activity tolerance. progressing, intermittently centralized to left SH  3. Patient will demonstrate symmetrical C/S rotation AROM to facilitate ADLs/IADLs. progressing, c/s rot WNL  · Long Term Goals: To be accomplished in  4  weeks:  1. Patient will demonstrate independence with HEP. 2. Patient will report greater than or equal to 50% improvement in sleep disturbance. MET-75%  3. Patient will report greater than or equal to 50% improvement in tolerance of work activities, including computer use. MET-75%  4. Patient will score greater than or equal to 65/100 on FOTO to indicate improved function. MET 72     PLAN  [x]  Upgrade activities as tolerated yes Continue plan of care   []  Discharge due to :    []  Other: Pt will benefit from further skilled PT services 2x wk x 4-6 wks to increase strength/stability and decrease sxs to promote functional mobility.      Therapist: John Bruno PTA    Date: 3/11/2020 Time: 4:14 PM     Future Appointments   Date Time Provider Castillo Delgadillo   3/11/2020  4:30 PM [facility]   3/13/2020  8:00 AM Lorena Amaya, PT [facility]   3/16/2020  8:00 AM Lorena Amaya PT [facility]   3/18/2020  8:00 AM Lorena Amaya PT [facility]   3/20/2020  8:00 AM Lorena Amaya PT [facility]   3/23/2020  8:00 AM Lorena Amaya PT [facility]   3/25/2020  4:30 PM [facility] hospital   3/27/2020  8:00 AM Gumaro Mendiola PTA [facility]   3/30/2020  8:00 AM Lorena Amaya PT [facility]

## 2020-03-11 NOTE — PROGRESS NOTES
[address] [facility] PHYSICAL THERAPY  [address] [facility], [address] - Phone: [phone]  Fax: [phone]  PROGRESS NOTE  Patient Name: Elizabeth Samaniego : 1965   Treatment/Medical Diagnosis: Neck pain [M54.2]   Referral Source: Kaylee Araya DO     Date of Initial Visit: 2020 Attended Visits: 10 Missed Visits: 0     SUMMARY OF TREATMENT  Patient's POC has consisted of therex, therapeutic activities, manual therapy prn, modalities prn, pt. education, and a comprehensive HEP. Treatment strategies used to address functional mobility deficits, ROM deficits, strength deficits, analyze and address soft tissue restrictions, analyze and cue movement patterns, analyze and modify body mechanics/ergonomics, assess and modify postural abnormalities and instruct in home and community integration. CURRENT STATUS  Tessa Sethi is progressing well in PT for c/s pain with Left radiculopathy as evidence by progressing towards (I) with HEP, decreasing sxs, and decreasing functional limitations. Pt reports daily compliance and is managing sxs with HEP. Pt continues to have intermittent left hand numbness/tingling; however, is reports overall 75% improvement in sxs. Pt reports ability to sleep 4-5 hours without sxs interrupting sleep which overall is 75% improvement. Pt with no difficulty turning head or reaching behind him, and no difficulty raising 25# OH with B UE assist. Pt continues to lack left c/s rot and reports peripheralization with left c/s rot AROM. Pt increased FOTO from 46 to 67;  indicating improved functional mobility    Goal/Measure of Progress Goal Met? 1. Patient will demonstrate independence with HEP. Status at last Eval: Established HEP Current Status: compliance with HEP progressing   2. Patient will report greater than or equal to 50% improvement in sleep disturbance. Status at last Eval: NT Current Status: 75% improvement yes   3.    Patient will report greater than or equal to 50% improvement in tolerance of work activities, including computer use. Status at last Eval: NT Current Status: 75% improvement yes   4. Patient will score greater than or equal to 65/100 on FOTO to indicate improved function   Status at last Eval: 52 Current Status: 72 yes     New Goals to be achieved in __4__  weeks:  1. Patient will demonstrate independence with HEP. 2. Pt to demo Left [address] MMT to 4+/5 to allow pt to perform [facility] activities without difficulty. 3. Pt to demo Left c/s Rot to 60 degs to allow pt to drive safely. RECOMMENDATIONS  Pt will benefit from further skilled PT services 2x wk x 4-6 wks to increase strength/stability and decrease sxs to promote functional mobility. If you have any questions/comments please contact us directly at [phone]. Thank you for allowing us to assist in the care of your patient. LPTA Signature: Felix Mccartney PTA    Date: 3/11/2020   PT Signature: Yahir Angela \"BJ\" Lis Scales, DPT, Cert. MDT, Cert. DN, Cert. SMT, Dip. Osteopractic   Time: 6:00 PM   NOTE TO PHYSICIAN:  PLEASE COMPLETE THE ORDERS BELOW AND FAX TO   [facility] Physical Therapy: [phone]. If you are unable to process this request in 24 hours please contact our office: [phone].    ___ I have read the above report and request that my patient continue as recommended.   ___ I have read the above report and request that my patient continue therapy with the following changes/special instructions:_________________________________________________________   ___ I have read the above report and request that my patient be discharged from therapy.      Physician Signature:        Date:       Time:

## 2020-03-13 ENCOUNTER — HOSPITAL ENCOUNTER (OUTPATIENT)
Dept: PHYSICAL THERAPY | Age: 55
Discharge: HOME | End: 2020-03-13
Payer: COMMERCIAL

## 2020-03-13 PROCEDURE — 97012 MECHANICAL TRACTION THERAPY: CPT

## 2020-03-13 PROCEDURE — 97110 THERAPEUTIC EXERCISES: CPT

## 2020-03-13 PROCEDURE — 97140 MANUAL THERAPY 1/> REGIONS: CPT

## 2020-03-13 NOTE — PROGRESS NOTES
PHYSICAL THERAPY - DAILY TREATMENT NOTE    Patient Name: Reena Balderas        Date: 3/13/2020  : 1965   YES Patient  Verified  Visit #:     Insurance: Payor: Kezia Martinez / Plan: Royal LEAL / Product Type: Commerical /      In time: 7:56 Out time: 9:00   Total Treatment Time: 64     TREATMENT AREA = Neck pain [M54.2]    SUBJECTIVE    Pain Level (on 0 to 10 scale):  1  / 10   Medication Changes/New allergies or changes in medical history, any new surgeries or procedures? NO    If yes, update Summary List   Subjective Functional Status/Changes:  []  No changes reported     \"I've had a couple of good days. Tuesday and Yesterday were relatively good. The tingling comes and goes with most of the pain now in the neck.  \"          OBJECTIVE    Therapeutic Procedures:  Min Procedure Specifics + Rationale   n/a [x]  Patient Education (performed throughout session) [x] Review HEP    [] Progressed/Changed HEP based on:   [] proper performance and advancement of Therex/TA   [] reduction in pain level    [] increased functional capacity       [] change in directional preference   39 [x] Therapeutic Exercise   [x]  See Flowsheet   Rationale: increase ROM and increase strength to improve the patients ability to participate in ADL's    10 []  Manual Therapy       [] IASTM  Static cupping to left periscapular region and tricep  [] TDN (see objective; actual needle insertion time not billed)   Rationale: decrease pain, increase ROM, increase tissue extensibility, decrease trigger points and increase postural awareness to attain functional use and participation with ADL's  [x] Skin assessment post-treatment:  [x]intact []redness- no adverse reaction   []redness  adverse     Modality rationale: decrease inflammation, decrease pain, increase tissue extensibility and increase muscle contraction/control to improve the patients ability to perform ADL's with greater ease       Min Type Additional Details   15 [x] Traction:    [x] Cervical  []Lumbar  [x] Intermitent      []Continuous Ramp/ hold/ lbs:  17/5lbs  Angle: 30 Degrees   16:20 ration  [x] supine              [] prone  [x] legs elevated    [] legs flat   [x] with MHP to C/S   [] with strap         [x] without strap   [x] Skin assessment post-treatment:  [x]intact [x]redness- no adverse reaction       []redness  adverse reaction:     Other Objective/Functional Measures:    therex revised per flow sheet  Increased reps/sets/resistance per flow sheet. Post Treatment Pain Level (on 0 to 10) scale:   2C  / 10     ASSESSMENT    Assessment/Changes in Function:     No recurrence of paraesthesia with therex progression         Patient will continue to benefit from skilled PT services to modify and progress therapeutic interventions, address functional mobility deficits, address ROM deficits, address strength deficits, analyze and address soft tissue restrictions, analyze and cue movement patterns, analyze and modify body mechanics/ergonomics and instruct in home and community integration  to attain remaining goals   Progress toward goals / Updated goals:    1st session since progress note. No significant progress observed       PLAN    [x]  Upgrade activities as tolerated  [x]  Update interventions per flow sheet YES Continue plan of care   []  Discharge due to :    []  Other:      Therapist: Karma Rosenbaum \"BJ\" PRAKASH Delgado, Cert. MDT, Cert. DN, Cert. SMT, Dip.  Osteopractic    Date: 3/13/2020 Time: 8:07 AM     Future Appointments   Date Time Provider Castillo Delgadillo   3/16/2020  8:00 AM Lai Lyons [address]   3/18/2020  8:00 AM Lai Lyons [address]   3/20/2020  8:00 AM Lai Lyons [address]   3/23/2020  8:00 AM Lai Lyons [address]   3/25/2020  4:30 PM Yolanda Hauser[address]   3/27/2020  8:00 AM Isis Palomino [address]   3/30/2020  8:00 AM Lai Lyons [address]

## 2020-03-16 ENCOUNTER — APPOINTMENT (OUTPATIENT)
Dept: PHYSICAL THERAPY | Age: 55
End: 2020-03-16
Payer: COMMERCIAL

## 2020-03-18 ENCOUNTER — HOSPITAL ENCOUNTER (OUTPATIENT)
Dept: PHYSICAL THERAPY | Age: 55
Discharge: HOME | End: 2020-03-18
Payer: COMMERCIAL

## 2020-03-18 PROCEDURE — 97110 THERAPEUTIC EXERCISES: CPT

## 2020-03-18 PROCEDURE — 97140 MANUAL THERAPY 1/> REGIONS: CPT

## 2020-03-18 PROCEDURE — 97012 MECHANICAL TRACTION THERAPY: CPT

## 2020-03-18 NOTE — PROGRESS NOTES
PHYSICAL THERAPY - DAILY TREATMENT NOTE    Patient Name: Delice Merlin        Date: 3/18/2020  : 1965   YES Patient  Verified  Visit #:   15   of   22  Insurance: Payor: Willam Meg / Plan: [employer] RPN / Product Type: Commerical /      In time: 8:05 Out time: 9:21   Total Treatment Time: 76     Medicare/BCBS [place] Time Tracking (below)   Total Timed Codes (min):  NA 1:1 Treatment Time:  NA     TREATMENT AREA =  Neck pain [M54.2]    SUBJECTIVE    Pain Level (on 0 to 10 scale):  3  / 10   Medication Changes/New allergies or changes in medical history, any new surgeries or procedures?    no    If yes, update Summary List   Subjective Functional Status/Changes:  []  No changes reported     \"I am getting an injection tomorrow. \"   \"I am starting to wean off the pain pills so I am feeling it a little more today. \"         OBJECTIVE    Modalities Rationale:  decrease pain and increase tissue extensibility to improve patient's ability to perform ADL's and functional mobility with decreased pain and radicular symptoms.      min [] Estim, type/location:                                      []  att     []  unatt     []  w/US     []  w/ice    []  w/heat   15 min [x]  Mechanical Traction: type/lbs 30 deg angle; 17# max/5# min; 60\" hold/20\" relax                  []  pro   [x]  sup   [x]  int   []  cont    []  before manual    []  after manual    min []  Ultrasound, settings/location:      min []  Iontophoresis w/ dexamethasone, location:                                               []  take home patch       []  in clinic    min []  Ice     []  Heat    location/position:     min []  Vasopneumatic Device, press/temp:     min []  Other:    [x] Skin assessment post-treatment (if applicable):    [x]  intact    []  redness- no adverse reaction     []redness  adverse reaction:      49 min Therapeutic Exercise:  [x]  See flow sheet   Rationale:      increase ROM and increase strength to improve the patients ability to perform ADL's and functional mobility with decreased pain. 12 min Manual Therapy: FRAM (F1-2) and IASTM with static cupping to B cervical paraspinal/upper trap/periscapular musculature   Rationale:      decrease pain, increase ROM and increase tissue extensibility to improve patient's ability to perform ADL's and functional mobility with decreased pain. min Patient Education:  YES  Reviewed HEP   []  Progressed/Changed HEP based on:   Cont HEP     Other Objective/Functional Measures:    No change in exercise routine today. Post Treatment Pain Level (on 0 to 10) scale:   2  / 10     ASSESSMENT    Assessment/Changes in Function:     Patient with reports of paraesthesia in left UE while setting up cervical traction. Placing left UE on pillow helped to decrease paraesthesia. []  See Progress Note/Recertification   Patient will continue to benefit from skilled PT services to modify and progress therapeutic interventions, address functional mobility deficits, address ROM deficits, address strength deficits, analyze and address soft tissue restrictions, analyze and cue movement patterns, analyze and modify body mechanics/ergonomics, assess and modify postural abnormalities and instruct in home and community integration to attain remaining goals. Progress toward goals / Updated goals:    New Goals to be achieved in __4__  weeks:  1. Patient will demonstrate independence with HEP. Cont HEP  2. Pt to demo Left SH MMT to 4+/5 to allow pt to perform  activities without difficulty. 3. Pt to demo Left c/s Rot to 60 degs to allow pt to drive safely.      PLAN    [x]  Upgrade activities as tolerated YES Continue plan of care   []  Discharge due to :    []  Other:      Therapist: Nikita Scruggs PT    Date: 3/18/2020 Time: 7:20 AM     Future Appointments   Date Time Provider Castillo Delgadillo   3/18/2020  8:00 AM Larry Key, PT [facility]   3/20/2020  8:00 AM Abdulaziz Vásquez PTA [facility]   3/23/2020 8:00 AM Alfred Carranza PT [facility]   3/25/2020  4:30 PM [facility]   3/27/2020  8:00 AM [facility]   3/30/2020  8:00 AM Alfred Carranza PT [facility]

## 2020-03-20 ENCOUNTER — HOSPITAL ENCOUNTER (OUTPATIENT)
Dept: PHYSICAL THERAPY | Age: 55
Discharge: HOME | End: 2020-03-20
Payer: COMMERCIAL

## 2020-03-20 PROCEDURE — 97530 THERAPEUTIC ACTIVITIES: CPT

## 2020-03-20 PROCEDURE — 97110 THERAPEUTIC EXERCISES: CPT

## 2020-03-20 NOTE — PROGRESS NOTES
PHYSICAL THERAPY - DAILY TREATMENT NOTE    Patient Name: Natalya Pérez        Date: 3/20/2020  : 1965   yes Patient  Verified  Visit #:   15  of   22  Insurance: Payor: Edmar Bautista / Plan: Daquan LEAL / Product Type: Commerical /      In time: 800 Out time: 841   Total Treatment Time: 41     TREATMENT AREA =  Neck pain [M54.2]    SUBJECTIVE  Pain Level (on 0 to 10 scale):  1  / 10   Medication Changes/New allergies or changes in medical history, any new surgeries or procedures?    no  If yes, update Summary List   Subjective Functional Status/Changes:  []  No changes reported      Pt reports he received injections in the back of the neck and SH blade, its seems a little tighter but sxs are less      OBJECTIVE       34 min Therapeutic Exercise:  [x]  See flow sheet   Rationale:      increase ROM, increase strength and improve coordination to improve the patients ability to  perform c/s AROM for ADLs/driving, lifting, reaching, and to perform prolong sitting without pain       15 min Manual Therapy: STM to t/s guillermo/scap border/UT and T/s EXt with OP    IASTM static cupping to c/s guillermo/UT/MT/scap border while performing prone Ms +seated SH abd    Rationale:      decrease pain, increase ROM, increase tissue extensibility, decrease trigger points and increase postural awareness to improve patient's ability to  perform c/s AROM for ADLs/driving, lifting, reaching, and to perform prolong sitting without pain        Billed With/As:   [] TE   [x] TA   [] Neuro   [] Self Care Patient Education: [x] Review HEP    [] Progressed/Changed HEP based on:   [x] positioning   [x] body mechanics   [x] transfers   [] heat/ice application    [x] other: Pt ed on importance and benefits of compliance with HEP, core strength/stability and proper posture; pt verbalized understanding       Other Objective/Functional Measures:  VCs + demo to perform proper technique for TE  initiated 1/2 prone rows and ext without c/o p!  c/o L UE sxs to elbow with WBIng with 1/2 prone positioned elbow fully extended   peripheralized with open book, held at this time    Post Treatment Pain Level (on 0 to 10) scale:   1 / 10     ASSESSMENT  Assessment/Changes in Function:   Progressed there-ex without c/o increase p!  peripheralized with STM to mid trap/rhomboid   continues to have sxs in left hand with TE   []  See Progress Note/Recertification   Patient will continue to benefit from skilled PT services to modify and progress therapeutic interventions, address functional mobility deficits, address ROM deficits, address strength deficits, analyze and address soft tissue restrictions, analyze and cue movement patterns, analyze and modify body mechanics/ergonomics, assess and modify postural abnormalities and instruct in home and community integration to attain remaining goals. Progress toward goals / Updated goals:  New Goals to be achieved in __4__  weeks:  1. Patient will demonstrate independence with HEP. 2. Pt to [facility] Left [address] MMT to 4+/5 to allow pt to perform [facility] activities without difficulty.    3. Pt to [facility] Left c/s Rot to 60 degs to allow pt to drive safely.        PLAN  [x]  Upgrade activities as tolerated yes Continue plan of care   []  Discharge due to :    []  Other:      Therapist: Dania Mackey PTA    Date: 3/20/2020 Time: 10:46 AM     Future Appointments   Date Time Provider Castillo Delgadillo   3/23/2020  8:00 AM Nanda Leavitt PT [address]   3/25/2020  4:30 PM Jason Whiting [facility] AT [address]   3/27/2020  8:00 AM Alfonzo Lambert PTA [address]   3/30/2020  8:00 AM Nanda Leavitt PT [address]

## 2020-03-23 ENCOUNTER — HOSPITAL ENCOUNTER (OUTPATIENT)
Dept: PHYSICAL THERAPY | Age: 55
Discharge: HOME | End: 2020-03-23
Payer: COMMERCIAL

## 2020-03-23 PROCEDURE — 97110 THERAPEUTIC EXERCISES: CPT

## 2020-03-23 PROCEDURE — 97530 THERAPEUTIC ACTIVITIES: CPT

## 2020-03-23 NOTE — PROGRESS NOTES
[address]  [facility] PHYSICAL THERAPY  [address] [facility] [address], [address] - Phone: [phone]  Fax: [phone] SUMMARY  Patient Name: Diana De La Garza : 1965   Treatment/Medical Diagnosis: Neck pain [M54.2]   Referral Source: [facility]     Date of Initial Visit: 2020 Attended Visits: 14 Missed Visits: 0     SUMMARY OF TREATMENT  Patient's POC has consisted of therex, therapeutic activities, manual therapy prn, modalities prn, pt. education, and a comprehensive HEP. Treatment strategies used to address functional mobility deficits, ROM deficits, strength deficits, analyze and address soft tissue restrictions, analyze and cue movement patterns, analyze and modify body mechanics/ergonomics, assess and modify postural abnormalities and instruct in home and community integration. CURRENT STATUS  Patient reports recent injection has significantly improved pain and response to therex, allowing for greater mobility and ADL's tolerance. He currently demonstrates B Cervical AROM rotation to 65 degrees. Paraesthesia is intermittent but continues to reach his hand. However, as previously stated, he has improved his ability to abolish the symptoms via therex. GOALS/MEASURE OF PROGRESS Goal Met? 1. Patient will demonstrate independence with HEP. 2. Pt to [facility] [address] MMT to 4+/5 to allow pt to perform [facility] activities without difficulty. 3. Pt to demo Left c/s Rot to 60 degs to allow pt to drive safely. MET    NO    MET     RECOMMENDATIONS  Other: Patient to be DC'd at this time per CDC guidelines and recommendations due to current COVID-19 precautions as patient does qualify for \"essential need classification. If you have any questions/comments please contact us directly at [phone]. Thank you for allowing us to assist in the care of your patient. Therapist Signature: Camacho Mcrae \"BJ\" Adrian Jerry, DPT, Cert. MDT, Cert. DN, Cert. SMT, Dip. Osteopractic Date: 3/23/2020   Reporting Period: n/a     Certification Period n/a Time: 8:32 AM     NOTE TO PHYSICIAN:  PLEASE COMPLETE THE ORDERS BELOW AND FAX TO   [facility] Physical Therapy: [phone]  If you are unable to process this request in 24 hours please contact our office: [phone]    ___ I have read the above report and request that my patient continue as recommended.   ___ I have read the above report and request that my patient continue therapy with the following changes/special instructions:_________________________________________________________   ___ I have read the above report and request that my patient be discharged from therapy.      Physician Signature:        Date:     Time:

## 2020-03-23 NOTE — PROGRESS NOTES
PHYSICAL THERAPY - DAILY TREATMENT NOTE    Patient Name: Carl Combs        Date: 3/23/2020  : 1965   YES Patient  Verified  Visit #:   15   of   22  Insurance: Payor: Carie Berry / Plan: [employer]AL / Product Type: Commerical /      In time: 7:50 Out time: 8:36   Total Treatment Time: 46     TREATMENT AREA = Neck pain [M54.2]    SUBJECTIVE    Pain Level (on 0 to 10 scale):  0-1  / 10   Medication Changes/New allergies or changes in medical history, any new surgeries or procedures? NO    If yes, update Summary List   Subjective Functional Status/Changes:  []  No changes reported     \"Still doing pretty good since the injections\"          OBJECTIVE    Therapeutic Procedures:  Min Procedure Specifics + Rationale   n/a [x]  Patient Education (performed throughout session) [x] Review HEP    [] Progressed/Changed HEP based on:   [] proper performance and advancement of Therex/TA   [] reduction in pain level    [] increased functional capacity       [] change in directional preference   38 [x] Therapeutic Exercise   [x]  See Flowsheet   Rationale: increase ROM and increase strength to improve the patients ability to participate in ADL's    8 [x] Therapeutic Activity   [x]  See Flowsheet  Re-assessment for DC  Rationale: To improve safety, proprioception, coordination, and efficiency with tasks         Other Objective/Functional Measures:    See DC     Post Treatment Pain Level (on 0 to 10) scale:   0  / 10     ASSESSMENT    Assessment/Changes in Function:     See DC         Patient will continue to benefit from skilled PT services to n/a  to attain remaining goals   Progress toward goals / Updated goals:    See DC     PLAN    [x]  Upgrade activities as tolerated  [x]  Update interventions per flow sheet NO Continue plan of care   []  Discharge due to :    []  Other:      Therapist: Galindo Guerrero \"BJ\" PRAKASH Delgado, Cert. MDT, Cert. DN, Cert. SMT, Dip.  Osteopractic    Date: 3/23/2020 Time: 8:12 AM     Future Appointments   Date Time Provider Castillo Delgadillo   3/25/2020  4:30 PM Kanwal [facility]   3/27/2020  8:00 AM Nara Ashley [facility]   3/30/2020  8:00 AM Benitez Orellana [facility]

## 2020-03-25 ENCOUNTER — APPOINTMENT (OUTPATIENT)
Dept: PHYSICAL THERAPY | Age: 55
End: 2020-03-25
Payer: COMMERCIAL

## 2020-03-27 ENCOUNTER — HOSPITAL ENCOUNTER (OUTPATIENT)
Dept: PHYSICAL THERAPY | Age: 55
End: 2020-03-27
Payer: COMMERCIAL

## 2020-03-30 ENCOUNTER — APPOINTMENT (OUTPATIENT)
Dept: PHYSICAL THERAPY | Age: 55
End: 2020-03-30
Payer: COMMERCIAL